# Patient Record
Sex: FEMALE | Race: WHITE | Employment: FULL TIME | URBAN - NONMETROPOLITAN AREA
[De-identification: names, ages, dates, MRNs, and addresses within clinical notes are randomized per-mention and may not be internally consistent; named-entity substitution may affect disease eponyms.]

---

## 2019-04-30 PROBLEM — O47.9 UTERINE CONTRACTIONS DURING PREGNANCY: Status: ACTIVE | Noted: 2019-04-30

## 2019-05-03 ENCOUNTER — ANESTHESIA EVENT (OUTPATIENT)
Dept: LABOR AND DELIVERY | Age: 29
End: 2019-05-03
Payer: COMMERCIAL

## 2019-05-03 ENCOUNTER — ANESTHESIA (OUTPATIENT)
Dept: LABOR AND DELIVERY | Age: 29
End: 2019-05-03
Payer: COMMERCIAL

## 2019-05-03 ENCOUNTER — HOSPITAL ENCOUNTER (INPATIENT)
Age: 29
LOS: 2 days | Discharge: HOME | End: 2019-05-05
Attending: OBSTETRICS & GYNECOLOGY | Admitting: OBSTETRICS & GYNECOLOGY
Payer: COMMERCIAL

## 2019-05-03 ENCOUNTER — APPOINTMENT (OUTPATIENT)
Dept: LABOR AND DELIVERY | Age: 29
End: 2019-05-03
Payer: COMMERCIAL

## 2019-05-03 LAB
ABO: NORMAL
AMPHETAMINE+METHAMPHETAMINE URINE SCREEN: NEGATIVE
ANTIBODY SCREEN: NORMAL
BARBITURATE QUANTITATIVE URINE: NEGATIVE
BASOPHILS # BLD: 0.3 %
BASOPHILS ABSOLUTE: 0 THOU/MM3 (ref 0–0.1)
BENZODIAZEPINE QUANTITATIVE URINE: NEGATIVE
CANNABINOID QUANTITATIVE URINE: NEGATIVE
COCAINE METABOLITE QUANTITATIVE URINE: NEGATIVE
EOSINOPHIL # BLD: 0.7 %
EOSINOPHILS ABSOLUTE: 0.1 THOU/MM3 (ref 0–0.4)
ERYTHROCYTE [DISTWIDTH] IN BLOOD BY AUTOMATED COUNT: 14 % (ref 11.5–14.5)
ERYTHROCYTE [DISTWIDTH] IN BLOOD BY AUTOMATED COUNT: 45.8 FL (ref 35–45)
HCT VFR BLD CALC: 36.5 % (ref 37–47)
HEMOGLOBIN: 12 GM/DL (ref 12–16)
IMMATURE GRANS (ABS): 0.17 THOU/MM3 (ref 0–0.07)
IMMATURE GRANULOCYTES: 1.3 %
LYMPHOCYTES # BLD: 12.8 %
LYMPHOCYTES ABSOLUTE: 1.7 THOU/MM3 (ref 1–4.8)
MCH RBC QN AUTO: 29.9 PG (ref 26–33)
MCHC RBC AUTO-ENTMCNC: 32.9 GM/DL (ref 32.2–35.5)
MCV RBC AUTO: 90.8 FL (ref 81–99)
MONOCYTES # BLD: 5.9 %
MONOCYTES ABSOLUTE: 0.8 THOU/MM3 (ref 0.4–1.3)
NUCLEATED RED BLOOD CELLS: 0 /100 WBC
OPIATES, URINE: NEGATIVE
OXYCODONE: NEGATIVE
PHENCYCLIDINE QUANTITATIVE URINE: NEGATIVE
PLATELET # BLD: 257 THOU/MM3 (ref 130–400)
PMV BLD AUTO: 11.9 FL (ref 9.4–12.4)
RBC # BLD: 4.02 MILL/MM3 (ref 4.2–5.4)
RH FACTOR: NORMAL
SEG NEUTROPHILS: 79 %
SEGMENTED NEUTROPHILS ABSOLUTE COUNT: 10.7 THOU/MM3 (ref 1.8–7.7)
WBC # BLD: 13.5 THOU/MM3 (ref 4.8–10.8)

## 2019-05-03 PROCEDURE — 2500000003 HC RX 250 WO HCPCS: Performed by: ANESTHESIOLOGY

## 2019-05-03 PROCEDURE — 6360000002 HC RX W HCPCS

## 2019-05-03 PROCEDURE — 86592 SYPHILIS TEST NON-TREP QUAL: CPT

## 2019-05-03 PROCEDURE — 2709999900 HC NON-CHARGEABLE SUPPLY

## 2019-05-03 PROCEDURE — 86850 RBC ANTIBODY SCREEN: CPT

## 2019-05-03 PROCEDURE — 86901 BLOOD TYPING SEROLOGIC RH(D): CPT

## 2019-05-03 PROCEDURE — 36415 COLL VENOUS BLD VENIPUNCTURE: CPT

## 2019-05-03 PROCEDURE — 3700000025 EPIDURAL BLOCK: Performed by: ANESTHESIOLOGY

## 2019-05-03 PROCEDURE — 1220000001 HC SEMI PRIVATE L&D R&B

## 2019-05-03 PROCEDURE — 3E033VJ INTRODUCTION OF OTHER HORMONE INTO PERIPHERAL VEIN, PERCUTANEOUS APPROACH: ICD-10-PCS | Performed by: OBSTETRICS & GYNECOLOGY

## 2019-05-03 PROCEDURE — 7200000001 HC VAGINAL DELIVERY

## 2019-05-03 PROCEDURE — 85025 COMPLETE CBC W/AUTO DIFF WBC: CPT

## 2019-05-03 PROCEDURE — 10907ZC DRAINAGE OF AMNIOTIC FLUID, THERAPEUTIC FROM PRODUCTS OF CONCEPTION, VIA NATURAL OR ARTIFICIAL OPENING: ICD-10-PCS | Performed by: OBSTETRICS & GYNECOLOGY

## 2019-05-03 PROCEDURE — 2580000003 HC RX 258: Performed by: OBSTETRICS & GYNECOLOGY

## 2019-05-03 PROCEDURE — 4A1HXCZ MONITORING OF PRODUCTS OF CONCEPTION, CARDIAC RATE, EXTERNAL APPROACH: ICD-10-PCS | Performed by: OBSTETRICS & GYNECOLOGY

## 2019-05-03 PROCEDURE — 86900 BLOOD TYPING SEROLOGIC ABO: CPT

## 2019-05-03 PROCEDURE — 80307 DRUG TEST PRSMV CHEM ANLYZR: CPT

## 2019-05-03 RX ORDER — NALBUPHINE HCL 10 MG/ML
5 AMPUL (ML) INJECTION EVERY 4 HOURS PRN
Status: DISCONTINUED | OUTPATIENT
Start: 2019-05-03 | End: 2019-05-04 | Stop reason: HOSPADM

## 2019-05-03 RX ORDER — ONDANSETRON 2 MG/ML
4 INJECTION INTRAMUSCULAR; INTRAVENOUS EVERY 6 HOURS PRN
Status: DISCONTINUED | OUTPATIENT
Start: 2019-05-03 | End: 2019-05-04 | Stop reason: HOSPADM

## 2019-05-03 RX ORDER — SODIUM CHLORIDE, SODIUM LACTATE, POTASSIUM CHLORIDE, CALCIUM CHLORIDE 600; 310; 30; 20 MG/100ML; MG/100ML; MG/100ML; MG/100ML
INJECTION, SOLUTION INTRAVENOUS CONTINUOUS
Status: DISCONTINUED | OUTPATIENT
Start: 2019-05-03 | End: 2019-05-04

## 2019-05-03 RX ORDER — ACETAMINOPHEN 325 MG/1
650 TABLET ORAL EVERY 4 HOURS PRN
Status: DISCONTINUED | OUTPATIENT
Start: 2019-05-03 | End: 2019-05-04 | Stop reason: HOSPADM

## 2019-05-03 RX ORDER — LIDOCAINE HYDROCHLORIDE 10 MG/ML
30 INJECTION, SOLUTION EPIDURAL; INFILTRATION; INTRACAUDAL; PERINEURAL PRN
Status: DISCONTINUED | OUTPATIENT
Start: 2019-05-03 | End: 2019-05-04 | Stop reason: HOSPADM

## 2019-05-03 RX ORDER — DIPHENHYDRAMINE HYDROCHLORIDE 50 MG/ML
25 INJECTION INTRAMUSCULAR; INTRAVENOUS EVERY 4 HOURS PRN
Status: DISCONTINUED | OUTPATIENT
Start: 2019-05-03 | End: 2019-05-04 | Stop reason: HOSPADM

## 2019-05-03 RX ORDER — MISOPROSTOL 200 UG/1
1000 TABLET ORAL PRN
Status: DISCONTINUED | OUTPATIENT
Start: 2019-05-03 | End: 2019-05-04 | Stop reason: HOSPADM

## 2019-05-03 RX ORDER — FERROUS SULFATE 325(65) MG
325 TABLET ORAL
COMMUNITY

## 2019-05-03 RX ORDER — NALOXONE HYDROCHLORIDE 0.4 MG/ML
0.4 INJECTION, SOLUTION INTRAMUSCULAR; INTRAVENOUS; SUBCUTANEOUS PRN
Status: DISCONTINUED | OUTPATIENT
Start: 2019-05-03 | End: 2019-05-04 | Stop reason: HOSPADM

## 2019-05-03 RX ORDER — MORPHINE SULFATE 2 MG/ML
2 INJECTION, SOLUTION INTRAMUSCULAR; INTRAVENOUS
Status: DISCONTINUED | OUTPATIENT
Start: 2019-05-03 | End: 2019-05-04 | Stop reason: HOSPADM

## 2019-05-03 RX ORDER — IBUPROFEN 800 MG/1
800 TABLET ORAL EVERY 8 HOURS PRN
Status: DISCONTINUED | OUTPATIENT
Start: 2019-05-03 | End: 2019-05-04 | Stop reason: HOSPADM

## 2019-05-03 RX ORDER — EPHEDRINE SULFATE/0.9% NACL/PF 50 MG/5 ML
5 SYRINGE (ML) INTRAVENOUS PRN
Status: DISCONTINUED | OUTPATIENT
Start: 2019-05-03 | End: 2019-05-04

## 2019-05-03 RX ORDER — BUTORPHANOL TARTRATE 1 MG/ML
1 INJECTION, SOLUTION INTRAMUSCULAR; INTRAVENOUS
Status: DISCONTINUED | OUTPATIENT
Start: 2019-05-03 | End: 2019-05-04 | Stop reason: HOSPADM

## 2019-05-03 RX ORDER — SEVOFLURANE 250 ML/250ML
1 LIQUID RESPIRATORY (INHALATION) CONTINUOUS PRN
Status: DISCONTINUED | OUTPATIENT
Start: 2019-05-03 | End: 2019-05-04 | Stop reason: HOSPADM

## 2019-05-03 RX ORDER — TERBUTALINE SULFATE 1 MG/ML
0.25 INJECTION, SOLUTION SUBCUTANEOUS ONCE
Status: DISCONTINUED | OUTPATIENT
Start: 2019-05-03 | End: 2019-05-04 | Stop reason: HOSPADM

## 2019-05-03 RX ORDER — MORPHINE SULFATE 4 MG/ML
4 INJECTION, SOLUTION INTRAMUSCULAR; INTRAVENOUS
Status: DISCONTINUED | OUTPATIENT
Start: 2019-05-03 | End: 2019-05-04 | Stop reason: HOSPADM

## 2019-05-03 RX ORDER — METHYLERGONOVINE MALEATE 0.2 MG/ML
200 INJECTION INTRAVENOUS PRN
Status: DISCONTINUED | OUTPATIENT
Start: 2019-05-03 | End: 2019-05-04 | Stop reason: HOSPADM

## 2019-05-03 RX ORDER — ONDANSETRON 2 MG/ML
8 INJECTION INTRAMUSCULAR; INTRAVENOUS EVERY 6 HOURS PRN
Status: DISCONTINUED | OUTPATIENT
Start: 2019-05-03 | End: 2019-05-04 | Stop reason: HOSPADM

## 2019-05-03 RX ADMIN — SODIUM CHLORIDE, POTASSIUM CHLORIDE, SODIUM LACTATE AND CALCIUM CHLORIDE: 600; 310; 30; 20 INJECTION, SOLUTION INTRAVENOUS at 20:51

## 2019-05-03 RX ADMIN — Medication 1 MILLI-UNITS/MIN: at 16:54

## 2019-05-03 RX ADMIN — Medication 16 ML/HR: at 20:23

## 2019-05-03 RX ADMIN — SODIUM CHLORIDE, POTASSIUM CHLORIDE, SODIUM LACTATE AND CALCIUM CHLORIDE: 600; 310; 30; 20 INJECTION, SOLUTION INTRAVENOUS at 18:10

## 2019-05-03 RX ADMIN — SODIUM CHLORIDE, POTASSIUM CHLORIDE, SODIUM LACTATE AND CALCIUM CHLORIDE: 600; 310; 30; 20 INJECTION, SOLUTION INTRAVENOUS at 13:40

## 2019-05-03 NOTE — FLOWSHEET NOTE
Pt arrives for scheduled induction at 40 6/7 weeks, has been feeling baby move, Patient to bathroom to void, informed of maternal drug testing policy in place on all laboring patients. Verbal consent received, paper consent to be signed and urine to be sent.  Dr Cady Pugh was in l/d prior to pt arrival and would like pt to be admitted, do ve, text her the ve results and she will decide when to start pitocin

## 2019-05-03 NOTE — PLAN OF CARE
Problem: Anxiety:  Goal: Level of anxiety will decrease  Description  Level of anxiety will decrease  Outcome: Ongoing   calm  Problem: Breathing Pattern - Ineffective:  Goal: Able to breathe comfortably  Description  Able to breathe comfortably  Outcome: Ongoing   Respirations regular and easy  Problem: Fluid Volume - Imbalance:  Goal: Absence of imbalanced fluid volume signs and symptoms  Description  Absence of imbalanced fluid volume signs and symptoms  Outcome: Ongoing   stable  Problem: Infection - Intrapartum Infection:  Goal: Will show no infection signs and symptoms  Description  Will show no infection signs and symptoms  Outcome: Ongoing   afebrile  Problem: Labor Process - Prolonged:  Goal: Uterine contractions within specified parameters  Description  Uterine contractions within specified parameters  Outcome: Ongoing   No contractions  Problem: Pain - Acute:  Goal: Able to cope with pain  Description  Able to cope with pain  Outcome: Ongoing   Denies pain, unsure of pain relief plan, pain goal 6  Problem: Tissue Perfusion - Uteroplacental, Altered:  Goal: Absence of abnormal fetal heart rate pattern  Description  Absence of abnormal fetal heart rate pattern  Outcome: Ongoing   Reactive fht's  Problem: Urinary Retention:  Goal: Urinary elimination within specified parameters  Description  Urinary elimination within specified parameters  Outcome: Ongoing   Bathroom priviliges  Problem: Falls - Risk of:  Goal: Will remain free from falls  Description  Will remain free from falls  Outcome: Ongoing   No falls  Problem: Discharge Planning:  Goal: Discharged to appropriate level of care  Description  Discharged to appropriate level of care  Outcome: Ongoing   Verbalizes understanding of discharge from l/d after 2 hour recovery  Care plan reviewed with patient and Naga Divine. Patient and Naga Divine verbalize understanding of the plan of care and contribute to goal setting.

## 2019-05-03 NOTE — PLAN OF CARE
Problem: Fluid Volume - Imbalance:  Goal: Absence of imbalanced fluid volume signs and symptoms  Description  Absence of imbalanced fluid volume signs and symptoms  5/3/2019 1920 by Donna Ocampo RN  Outcome: Met This Shift     Problem: Anxiety:  Goal: Level of anxiety will decrease  Description  Level of anxiety will decrease  5/3/2019 1920 by Donna Ocampo RN  Outcome: Ongoing  Note:   Appears calm and relaxed     Problem: Breathing Pattern - Ineffective:  Goal: Able to breathe comfortably  Description  Able to breathe comfortably  5/3/2019 1920 by Donna Ocampo RN  Outcome: Ongoing  Note:   Resp easy and regular     Problem: Infection - Intrapartum Infection:  Goal: Will show no infection signs and symptoms  Description  Will show no infection signs and symptoms  5/3/2019 1920 by Donna Ocampo RN  Outcome: Ongoing  Note:   Vitals stable. afebrile     Problem: Labor Process - Prolonged:  Goal: Uterine contractions within specified parameters  Description  Uterine contractions within specified parameters  5/3/2019 1920 by Donna Ocampo RN  Outcome: Ongoing  Note:   Contractions every 2-3 min     Problem: Pain - Acute:  Goal: Able to cope with pain  Description  Able to cope with pain  5/3/2019 1920 by Donna Ocampo RN  Outcome: Ongoing  Note:   Denies need for pain meds at this time. Plans epidural.     Problem: Tissue Perfusion - Uteroplacental, Altered:  Goal: Absence of abnormal fetal heart rate pattern  Description  Absence of abnormal fetal heart rate pattern  5/3/2019 1920 by Donna Ocampo RN  Outcome: Ongoing  Note:   Reactive strip.      Problem: Urinary Retention:  Goal: Urinary elimination within specified parameters  Description  Urinary elimination within specified parameters  5/3/2019 1920 by Donna Ocampo RN  Outcome: Ongoing  Note:   Voiding qs     Problem: Falls - Risk of:  Goal: Will remain free from falls  Description  Will remain free from falls  5/3/2019 1920 by Donna Ocampo RN  Outcome: Ongoing  Note:   SR up time 2. Call light within reach. Husb at bedside     Problem: Discharge Planning:  Goal: Discharged to appropriate level of care  Description  Discharged to appropriate level of care  5/3/2019 1920 by Charline Gonzalez RN  Outcome: Ongoing  Note:   Planning TRO to mom baby after delivery then home in 2-3 days. Problem: Pain:  Goal: Pain level will decrease  Description  Pain level will decrease  Outcome: Ongoing     Problem: Pain:  Goal: Control of acute pain  Description  Control of acute pain  Outcome: Ongoing     Problem: Pain:  Goal: Control of chronic pain  Description  Control of chronic pain  Outcome: Ongoing   Care plan reviewed with patient and husb. Patient and husb verbalize understanding of the plan of care and contribute to goal setting.

## 2019-05-04 LAB — RPR: NONREACTIVE

## 2019-05-04 PROCEDURE — 1220000000 HC SEMI PRIVATE OB R&B

## 2019-05-04 PROCEDURE — 6370000000 HC RX 637 (ALT 250 FOR IP): Performed by: OBSTETRICS & GYNECOLOGY

## 2019-05-04 RX ORDER — LANOLIN 100 %
OINTMENT (GRAM) TOPICAL PRN
Status: DISCONTINUED | OUTPATIENT
Start: 2019-05-04 | End: 2019-05-05 | Stop reason: HOSPADM

## 2019-05-04 RX ORDER — ACETAMINOPHEN 325 MG/1
650 TABLET ORAL EVERY 4 HOURS PRN
Status: DISCONTINUED | OUTPATIENT
Start: 2019-05-04 | End: 2019-05-05 | Stop reason: HOSPADM

## 2019-05-04 RX ORDER — IBUPROFEN 800 MG/1
800 TABLET ORAL EVERY 8 HOURS PRN
Status: DISCONTINUED | OUTPATIENT
Start: 2019-05-04 | End: 2019-05-05 | Stop reason: HOSPADM

## 2019-05-04 RX ORDER — SODIUM CHLORIDE 0.9 % (FLUSH) 0.9 %
10 SYRINGE (ML) INJECTION EVERY 12 HOURS SCHEDULED
Status: DISCONTINUED | OUTPATIENT
Start: 2019-05-04 | End: 2019-05-05 | Stop reason: HOSPADM

## 2019-05-04 RX ORDER — SODIUM CHLORIDE 0.9 % (FLUSH) 0.9 %
10 SYRINGE (ML) INJECTION PRN
Status: DISCONTINUED | OUTPATIENT
Start: 2019-05-04 | End: 2019-05-05 | Stop reason: HOSPADM

## 2019-05-04 RX ORDER — METHYLERGONOVINE MALEATE 0.2 MG/ML
200 INJECTION INTRAVENOUS
Status: ACTIVE | OUTPATIENT
Start: 2019-05-04 | End: 2019-05-04

## 2019-05-04 RX ORDER — DOCUSATE SODIUM 100 MG/1
100 CAPSULE, LIQUID FILLED ORAL 2 TIMES DAILY
Status: DISCONTINUED | OUTPATIENT
Start: 2019-05-04 | End: 2019-05-05 | Stop reason: HOSPADM

## 2019-05-04 RX ORDER — MISOPROSTOL 200 UG/1
800 TABLET ORAL PRN
Status: DISCONTINUED | OUTPATIENT
Start: 2019-05-04 | End: 2019-05-05 | Stop reason: HOSPADM

## 2019-05-04 RX ORDER — SODIUM CHLORIDE, SODIUM LACTATE, POTASSIUM CHLORIDE, CALCIUM CHLORIDE 600; 310; 30; 20 MG/100ML; MG/100ML; MG/100ML; MG/100ML
INJECTION, SOLUTION INTRAVENOUS CONTINUOUS
Status: DISCONTINUED | OUTPATIENT
Start: 2019-05-04 | End: 2019-05-05 | Stop reason: HOSPADM

## 2019-05-04 RX ORDER — CARBOPROST TROMETHAMINE 250 UG/ML
250 INJECTION, SOLUTION INTRAMUSCULAR PRN
Status: DISCONTINUED | OUTPATIENT
Start: 2019-05-04 | End: 2019-05-05 | Stop reason: HOSPADM

## 2019-05-04 RX ORDER — ONDANSETRON 4 MG/1
8 TABLET, FILM COATED ORAL EVERY 8 HOURS PRN
Status: DISCONTINUED | OUTPATIENT
Start: 2019-05-04 | End: 2019-05-05 | Stop reason: HOSPADM

## 2019-05-04 RX ADMIN — DOCUSATE SODIUM 100 MG: 100 CAPSULE, LIQUID FILLED ORAL at 01:28

## 2019-05-04 RX ADMIN — IBUPROFEN 800 MG: 800 TABLET ORAL at 22:15

## 2019-05-04 RX ADMIN — DOCUSATE SODIUM 100 MG: 100 CAPSULE, LIQUID FILLED ORAL at 14:16

## 2019-05-04 RX ADMIN — IBUPROFEN 800 MG: 800 TABLET ORAL at 01:28

## 2019-05-04 RX ADMIN — IBUPROFEN 800 MG: 800 TABLET ORAL at 14:16

## 2019-05-04 ASSESSMENT — PAIN SCALES - GENERAL
PAINLEVEL_OUTOF10: 2
PAINLEVEL_OUTOF10: 1
PAINLEVEL_OUTOF10: 1

## 2019-05-04 NOTE — H&P
Obstetric Admission Note        CHIEF COMPLAINT: for delivery    HISTORY OF PRESENT ILLNESS:                     The patient is a 30 y.o.  female @ 40+6 weeks with significant past medical history of severe leukocytosis with her last delivery and pp depression who presents for an induction due to postdates. No SROM, good FM. No regular ctxs on admission but getting more uncomfortable. Awaiting epidural.        Past Medical History:        Diagnosis Date    Abnormal Pap smear of cervix 2016    Postpartum depression     no meds       Medications Prior to Admission:   Medications Prior to Admission: ferrous sulfate 325 (65 Fe) MG tablet, Take 325 mg by mouth daily (with breakfast)  Prenatal Vit w/Mg-Rqmjwtaqk-OO (PNV PO), Take by mouth  folic acid (FOLVITE) 1 MG tablet, Take 1 mg by mouth daily    Allergies:  Patient has no known allergies. DATA:    Cervical exam /-1  Membranes ruptured at 2000  FHT's 120 reactive  [place] q2-3min    ASSESSMENT AND PLAN:      Assessment problems  IUP @ 40+6wks  Postdates  Induction  Hx of pp depression    Plan:  - Admit to L&D  - Anticipate Vaginal Delivery  Ning CRAIG

## 2019-05-04 NOTE — ANESTHESIA PRE PROCEDURE
Department of Anesthesiology  Preprocedure Note       Name:  Shani Hobbs   Age:  30 y.o.  :  1990                                          MRN:  [id number]         Date:  5/3/2019      Surgeon: * No surgeons listed *    Procedure: ANESTHESIA LABOR ANALGESIA    Medications prior to admission:   Prior to Admission medications    Medication Sig Start Date End Date Taking?  Authorizing Provider   ferrous sulfate 325 (65 Fe) MG tablet Take 325 mg by mouth daily (with breakfast)   Yes Historical Provider, MD   Prenatal Vit w/Kx-Fctrscjob-YE (PNV PO) Take by mouth   Yes Historical Provider, MD   folic acid (FOLVITE) 1 MG tablet Take 1 mg by mouth daily   Yes Historical Provider, MD       Current medications:    Current Facility-Administered Medications   Medication Dose Route Frequency Provider Last Rate Last Dose    lactated ringers infusion   Intravenous Continuous Nenita Belch,  mL/hr at 19      lidocaine PF 1 % injection 30 mL  30 mL Other PRN Nenita Belch, DO        butorphanol (STADOL) injection 1 mg  1 mg Intravenous Q1H PRN Nenita Belch, DO        nitrous oxide 50% inhalation 1 each  1 each Inhalation Continuous PRN Nenita Belch, DO        acetaminophen (TYLENOL) tablet 650 mg  650 mg Oral Q4H PRN Nenita Belch, DO        ibuprofen (ADVIL;MOTRIN) tablet 800 mg  800 mg Oral Q8H PRN Nenita Belch, DO        morphine (PF) injection 2 mg  2 mg Intravenous Q2H PRN Nenita Belch, DO        Or    morphine injection 4 mg  4 mg Intravenous Q2H PRN Nenita Belch, DO        oxytocin (PITOCIN) 30 units in 500 mL infusion  1 gustabo-units/min Intravenous Continuous Nenita Belch, DO 6 mL/hr at 19 6 gustabo-units/min at 19    diphenhydrAMINE (BENADRYL) injection 25 mg  25 mg Intravenous Q4H PRN Nenita Belch, DO        [facility] injection 8 mg  8 mg Intravenous Q6H PRN Nenita Belch, DO        terbutaline (BRETHINE) injection 0.25 mg  0.25 mg Subcutaneous Once Fabiola Sallies, DO        oxytocin (PITOCIN) 30 units in 500 mL infusion  1 gustabo-units/min Intravenous Continuous PRN Fabiola Sallies, DO        methylergonovine (METHERGINE) injection 200 mcg  200 mcg Intramuscular PRN Fabiola Sallies, DO        misoprostol (CYTOTEC) tablet 1,000 mcg  1,000 mcg Rectal PRN Fabiola Sallies, DO        witch hazel-glycerin (TUCKS) pad   Topical PRN Fabiola Sallies, DO        benzocaine-menthol (DERMOPLAST) 20-0.5 % spray   Topical PRN Fabiola Sallies, DO           Allergies:  No Known Allergies    Problem List:    Patient Active Problem List   Diagnosis Code    Uterine contractions during pregnancy O62.2       Past Medical History:        Diagnosis Date    Abnormal Pap smear of cervix 2016    Postpartum depression     no meds       Past Surgical History:  History reviewed. No pertinent surgical history. Social History:    Social History     Tobacco Use    Smoking status: Never Smoker    Smokeless tobacco: Never Used   Substance Use Topics    Alcohol use: No                                Counseling given: Not Answered      Vital Signs (Current):   Vitals:    05/03/19 1909 05/03/19 1912 05/03/19 1915 05/03/19 1945   BP: 102/61   107/67   Pulse: 72   71   Resp:  18  18   Temp:   97.3 °F (36.3 °C)    TempSrc:       SpO2:       Weight:       Height:                                                  BP Readings from Last 3 Encounters:   05/03/19 107/67   04/30/19 118/74   12/23/16 122/68       NPO Status: Time of last liquid consumption: 1230                        Time of last solid consumption: 1230                        Date of last liquid consumption: 05/03/19                        Date of last solid food consumption: 05/03/19    BMI:   Wt Readings from Last 3 Encounters:   05/03/19 146 lb (66.2 kg)   04/30/19 146 lb (66.2 kg)   12/19/16 140 lb (63.5 kg)     Body mass index is 25.06 kg/m².     CBC:   Lab Results   Component Value Date    WBC 13.5 05/03/2019    RBC 4.02 05/03/2019    HGB 12.0 05/03/2019    HCT 36.5 05/03/2019    MCV 90.8 05/03/2019    RDW 13.4 12/22/2016     05/03/2019       CMP:   Lab Results   Component Value Date    CREATININE 0.6 12/20/2016    LABGLOM >90 12/20/2016       POC Tests: No results for input(s): POCGLU, POCNA, POCK, POCCL, POCBUN, POCHEMO, POCHCT in the last 72 hours. Coags: No results found for: PROTIME, INR, APTT    HCG (If Applicable): No results found for: PREGTESTUR, PREGSERUM, HCG, HCGQUANT     ABGs: No results found for: PHART, PO2ART, JAB1VCT, GBB9GSH, BEART, M6VDTIGL     Type & Screen (If Applicable):  Lab Results   Component Value Date    LABRH POS 05/03/2019       Anesthesia Evaluation   no history of anesthetic complications:   Airway: Mallampati: II  TM distance: >3 FB   Neck ROM: full  Mouth opening: > = 3 FB Dental:          Pulmonary:normal exam              Patient did not smoke on day of surgery. Cardiovascular:  Exercise tolerance: good (>4 METS),                     Neuro/Psych:   (+) psychiatric history:            GI/Hepatic/Renal: Neg GI/Hepatic/Renal ROS            Endo/Other: Negative Endo/Other ROS             Pt had no PAT visit       Abdominal:           Vascular: negative vascular ROS. Anesthesia Plan      epidural     ASA 2             Anesthetic plan and risks discussed with patient.                       Addison Chappell MD   5/3/2019

## 2019-05-04 NOTE — ANESTHESIA PROCEDURE NOTES
Epidural Block    Patient location during procedure: OB  Reason for block: labor epidural  Staffing  Anesthesiologist: Adwoa Pinedo MD  Performed: anesthesiologist   Preanesthetic Checklist  Completed: patient identified, site marked, surgical consent, pre-op evaluation, timeout performed, IV checked, risks and benefits discussed, monitors and equipment checked, anesthesia consent given, oxygen available and patient being monitored  Epidural  Location: lumbar (1-5)  Needle  Catheter type: side hole  Additional Notes  Procedure: Labor Epidural (78804)     Diagnosis: Vaginal Delivery (650N)    Procedure Start Time:  2013  Procedure End Time:  2023    Allergies:  Patient has no known allergies. H&P Status: H&P was reviewed, the patient was examined and no change has occurred in patient's condition since H&P was completed. Diagnostic Data Review:  PT/INR  No results found for: PTINR  PT/INR Warfarin:  No components found for: PTPATWAR, PTINRWAR  PTT: No results found for: APTT  PTT Heparin: No components found for: APTTHEP  CBC: Lab Results       Component                Value               Date                       WBC                      13.5                05/03/2019                 RBC                      4.02                05/03/2019                 HGB                      12.0                05/03/2019                 HCT                      36.5                05/03/2019                 MCV                      90.8                05/03/2019                 RDW                      13.4                12/22/2016                 PLT                      257                 05/03/2019              Consent:  Risks and benefits of the labor epidural were discussed and the patient was given the opportunity to ask questions. Informed consent was obtained. Procedure:  Position: Sitting. Sterile technique: Hat,mask,gloves. Skin Prep/Drape: Betadine.   Skin Local: 3ml 1% lidocaine  Interspace: L4-L5 Catheter Distances:  Skin to epidural space 4cm. Catheter 9cm at skin. Aspiration for CSF/Blood: Negative    Paresthesia: Negative    Test dose: Negative (3ml 1. 5%Lidocaine with 1:200,000 Epinephrine)    Difficulties/Complications: None    Epidural Medication:  Bolus Dose:   Premixed Syringe: Ropivacaine 0.2% 0ml given. Injection was made incrementally with constant monitoring and aspiration. Infusion Dose:  Premixed Bag: Ropivacaine 0.1% with Fentanyl 3 mcg/ml started at 16 ml/hr. Catheter bolused with 15 mL from premixed bag.     Kaycee Mcdermott MD (ATTENDING ANESTHESIOLOGIST: Imm)    8:25 PM

## 2019-05-04 NOTE — FLOWSHEET NOTE
Patient up to bathroom for second time. Successful void. Small amount of lochia noted. Patient back to bed fundus midline firm at U/U. IV out per order.  Анна Multani

## 2019-05-04 NOTE — DISCHARGE SUMMARY
Obstetric Discharge Summary      Pt Name: Chiquita Baig  MRN: [id number] [place] #: [de-identified]  YOB: 1990        Admitting Diagnosis  IUP, postdates  OB History        2    Para   2    Term   2       0    AB   0    Living   2       SAB   0    TAB   0    Ectopic   0    Molar        Multiple   0    Live Births   2                Reasons for Admission on 5/3/2019  1:07 PM  Spontaneous vaginal delivery [O80]  No comment available  Vaginal Delivery      Intrapartum Procedures        Multiple birth?: no                 Postpartum/Operative Complications    none     Data  Information for the patient's :  Tamika Mcdonald Girl Brandon Coelho [[id number]]   female  Birth Weight: 6 lb 10.7 oz (3.025 kg)      Discharge Diagnosis    s/p     Discharge Information  Current Discharge Medication List      CONTINUE these medications which have NOT CHANGED    Details   ferrous sulfate 325 (65 Fe) MG tablet Take 325 mg by mouth daily (with breakfast)      Prenatal Vit w/Ym-Bzsqaxrrp-EZ (PNV PO) Take by mouth      folic acid (FOLVITE) 1 MG tablet Take 1 mg by mouth daily             No discharge procedures on file. Vaginal Delivery  Diet regular  Disposition: stable  Dischrge date: 19  Discharge to:  home  Follow up in 4 weeks  Pacheco Frost.

## 2019-05-04 NOTE — L&D DELIVERY NOTE
Department of Obstetrics and Gynecology  Spontaneous Vaginal Delivery Note      Pt Name: Angie Del Valle  MRN: [id number] [place] #: [de-identified]  YOB: 1990  Procedure Performed By: Frantz Leos D.O.        Pre-operative Diagnosis:  Term pregnancy, Induced labor, Single fetus and Pregnancy complicated by: postdates    Post-operative Diagnosis: Same, delivered. Procedure:  Spontaneous vaginal delivery or Midline episiotomy and repair    Surgeon:  Sherin Ivey DO, D.O. Information for the patient's :  Jonathan August [[id number]]          Anesthesia:  epidural anesthesia    Estimated blood loss:  400ml    Specimen:  Placenta not sent to pathology     Complications:  none    Condition:  infant stable to general nursery and mother stable    Details of Procedure: The patient is a 30 y.o. female at 38w9d   OB History        2    Para   1    Term   1       0    AB   0    Living   1       SAB   0    TAB   0    Ectopic   0    Molar        Multiple   0    Live Births   1             who was admitted for induction and postdates. She received the following interventions: ARBOW and IV Pitocin induction. The patient progressed well,did not receive an epidural, became complete and started to push. Patient progressed well and the fetal head was delivered over a MLE, no nuchal cord was noted, and the rest of the infant delivered atraumatically. Infant was placed on mother abdomen. Nose and mouth suctioned with bulb suction. Cord was clamped and cut. The delivery of the placenta was spontaneous and noted intact. The perineum and vagina were explored and a second degree MLE was repaired in standard fashion with 3-0 vicryl. Sponge, instruments, and needle counts were correct. Needles were disposed of appropriately. Delivery Summary:       Sherin Ivey D.O.     Mother's Information    Labor Events     labor?:  No  Rupture type:  Artificial=AROM, Intact  Fluid Yes  Cord blood disposition:  Lab  Gases sent?:  No  Stem cell collection (by provider): No     Placenta    Removal:  Spontaneous  Appearance:  Intact  Disposition:  Refrigerator     Delivery Resuscitation    Method:  Bulb Suction, Stimulation     Apgars    Living status:  Living  Apgars   1 Minute:   5 Minute:   10 Minute 15 Minute 20 Minute   Skin Color: 0  1       Heart Rate: 2  2       Reflex Irritability: 2  2       Muscle Tone: 2  2       Respiratory Effort: 2  2       Total: 8  9                  Skin to Skin    Skin to skin initiation date/time: 5/3/19 21:56:37   Skin to skin with:   Mother  Skin to skin end date/time:        [employer] Measurements       Delivery Information    Episiotomy:  Median  Indications for episiotomy:  Other  Perineal lacerations:  None    Vaginal laceration:  No    Cervical laceration:  No    Surgical or additional est. blood loss (mL):  0 (View Only):  Edit in Flowsheets   Combined est. blood loss (mL):  0     Vaginal Delivery Counts    Initial count personnel:  Balta Ayers  Initial count verified by:  16 INSTRUMENTS,1 NEEDLE,10 SPONGES   4x4:   Needles:   Instruments:   Lap Pads:   Sponges:     Initial counts:          Final counts:          Final count personnel:  PRABHJOT CRAWFORD/ DR HWANG  Final count verified by:  10 SPONGES,1 PLUS 2 FOR 3 TOTAL NEEDLES,16 INSTRUMENTS  Accurate final count?:  Yes  Final vaginal sweep completed:  Yes     Other Procedures    Procedures:  None

## 2019-05-04 NOTE — LACTATION NOTE
Pt states nursing is going well. Pt states no questions or concerns at this time. Encouraged Pt to call out for assistance as needed. Will follow up PRN.

## 2019-05-04 NOTE — FLOWSHEET NOTE
Patient arrived to [phone] via wheelchair with  in arms.  by her side. Bedside shift report received from [facility]. Patient up to the bathroom at this time. Successful void. Small amount of lochia noted no clots. [place] care done per patient. Patient back to bed. Fundus midline firm at U/U. Instructed patient to use call light when needing to void again. Patient also oriented to room, plan of care, and ducks in a row.  April Dionne Duane

## 2019-05-04 NOTE — PLAN OF CARE
no infection signs and symptoms   5/4/2019 1714 by Hoa Jiménez RN  Note:   Vital signs WNL, no sign of infection     Problem: Mood - Altered:  Goal: Mood stable  Description  Mood stable  5/4/2019 1714 by Meaghan Milligan RN  Note:   Pt is cheerful and appropriate  5/4/2019 0931 by Meaghan Milligan RN  Outcome: Ongoing  Note:   Pt is cheerful and appropriate     Problem: Infection - Risk of, Puerperal Infection:  Goal: Will show no infection signs and symptoms  Description  Will show no infection signs and symptoms   5/4/2019 1714 by Hoa Jiménez RN  Note:   Vital signs WNL, no sign of infection     Problem: Mood - Altered:  Goal: Mood stable  Description  Mood stable  5/4/2019 0931 by Hoa Jiménez RN  Outcome: Ongoing  Note:   Pt is cheerful and appropriate   Care plan reviewed with patient. Patient  verbalize understanding of the plan of care and contribute to goal setting.

## 2019-05-04 NOTE — PROGRESS NOTES
Department of Obstetrics and Gynecology  Labor and Delivery  Post Partum Day #1      SUBJECTIVE:  Patient feeling well with no complaints. Breastfeeding without difficulty. Mild lochia. Patient denies pain. Urination without difficulty. OBJECTIVE:/60   Pulse 94   Temp 97.9 °F (36.6 °C) (Oral)   Resp 16   Ht 5' 4\" (1.626 m)   Wt 146 lb (66.2 kg)   SpO2 100%   Breastfeeding? Unknown   BMI 25.06 kg/m²    ABDOMEN:  Soft, non-tender, fundus firm. Extremities: warm and dry. No edema    DATA:    Lab Results   Component Value Date    HGB 12.0 2019    HCT 36.5 2019       ASSESSMENT & PLAN:    PPD #1 s/p .         Routine PP care       D/C home 19       F/U 4 weeks       Nenita Rodriguez

## 2019-05-05 VITALS
RESPIRATION RATE: 16 BRPM | TEMPERATURE: 97.8 F | HEIGHT: 64 IN | HEART RATE: 94 BPM | WEIGHT: 146 LBS | DIASTOLIC BLOOD PRESSURE: 59 MMHG | SYSTOLIC BLOOD PRESSURE: 117 MMHG | BODY MASS INDEX: 24.92 KG/M2 | OXYGEN SATURATION: 100 %

## 2019-05-05 PROCEDURE — 6370000000 HC RX 637 (ALT 250 FOR IP): Performed by: OBSTETRICS & GYNECOLOGY

## 2019-05-05 RX ADMIN — IBUPROFEN 800 MG: 800 TABLET ORAL at 06:34

## 2019-05-05 ASSESSMENT — PAIN SCALES - GENERAL: PAINLEVEL_OUTOF10: 1

## 2019-05-05 NOTE — FLOWSHEET NOTE
Discharge teaching and instructions completed with patient using teachback method. AVS reviewed. Patient voiced understanding regarding follow up appointments, and care of self at home. Discharged to private auto, accompanied by  daughter.

## 2019-05-05 NOTE — ANESTHESIA POSTPROCEDURE EVALUATION
Department of Anesthesiology  Postprocedure Note    Patient: Ines Aguirre  MRN: [id number]  YOB: 1990  Date of evaluation: 5/5/2019  Time:  7:45 AM     Procedure Summary     Date:  05/03/19 Room / Location:      Anesthesia Start:  2013 Anesthesia Stop:  2153    Procedure:  ANESTHESIA LABOR ANALGESIA Diagnosis:      Scheduled Providers:   Responsible Provider:  Elisabeth Flynn MD    Anesthesia Type:  epidural ASA Status:  2          Anesthesia Type: epidural    Eamon Phase I: Eamon Score: 10    Eamon Phase II: Eamon Score: 10    Last vitals: Reviewed and per EMR flowsheets.        Anesthesia Post Evaluation    Patient location during evaluation: floor  Patient participation: complete - patient participated  Level of consciousness: awake and alert  Airway patency: patent  Nausea & Vomiting: no nausea and no vomiting  Complications: no  Cardiovascular status: hemodynamically stable  Respiratory status: acceptable, room air and spontaneous ventilation  Hydration status: stable

## 2019-05-05 NOTE — PLAN OF CARE
Problem: Fluid Volume - Imbalance:  Goal: Absence of postpartum hemorrhage signs and symptoms  Description  Absence of postpartum hemorrhage signs and symptoms  5/4/2019 2249 by Анна Quinones RN  Outcome: Ongoing  Note:   Vaginal bleeding WNL, no clots or foul odors. Problem: Infection - Intrapartum Infection:  Goal: Will show no infection signs and symptoms  Description  Will show no infection signs and symptoms   5/4/2019 2249 by Анна Quinones RN  Outcome: Ongoing  Note:   Afebrile this shift. Problem: Pain - Acute:  Goal: Able to cope with pain  Description  Able to cope with pain   5/4/2019 2249 by Анна Quinones RN  Outcome: Ongoing  Note:   Scheduled motrin given with relief for abdominal cramping. Problem: Discharge Planning:  Goal: Discharged to appropriate level of care  Description  Discharged to appropriate level of care  5/4/2019 2249 by Uche Lopez RN  Outcome: Ongoing  Note:   Plan is to be discharged home with  tomorrow. Problem: Constipation:  Goal: Bowel elimination is within specified parameters  Description  Bowel elimination is within specified parameters  5/4/2019 2249 by Анна Quinones RN  Outcome: Ongoing  Note:   Patient passing gas. Problem: Infection - Risk of, Puerperal Infection:  Goal: Will show no infection signs and symptoms  Description  Will show no infection signs and symptoms   5/4/2019 2249 by Анна Quinones RN  Outcome: Ongoing  Note:   Afebrile this shift. Problem: Mood - Altered:  Goal: Mood stable  Description  Mood stable  5/4/2019 2249 by Анна Quinones RN  Outcome: Ongoing  Note:   Emotional support provided. Care plan reviewed with patient and she contributes to goal setting and voices understanding of plan of care.

## 2019-05-05 NOTE — LACTATION NOTE
Pt states breastfeeding is going well. Pt states no questions or concerns at this time. Encouraged Pt to call with questions as needed. Will follow up PRN.

## 2019-05-05 NOTE — FLOWSHEET NOTE
Mother's blood type is A+. Baby's blood type is UNKNOWN. Mother DID NOT receive Rhogam.Postpartum education brochure given, teaching complete. [place] postpartum depression screening discussed with patient. Patient instructed to complete [place] postpartum depression screening in 2 weeks and contact her healthcare provider if her score is > 10. Patient voiced understanding. Reviewed postpartum birth warning signs flyer with patient. Patient has voiced understanding of teaching.